# Patient Record
Sex: FEMALE | Race: WHITE
[De-identification: names, ages, dates, MRNs, and addresses within clinical notes are randomized per-mention and may not be internally consistent; named-entity substitution may affect disease eponyms.]

---

## 2022-10-31 ENCOUNTER — HOSPITAL ENCOUNTER (OUTPATIENT)
Dept: HOSPITAL 76 - DI.N | Age: 67
Discharge: HOME | End: 2022-10-31
Attending: STUDENT IN AN ORGANIZED HEALTH CARE EDUCATION/TRAINING PROGRAM
Payer: COMMERCIAL

## 2022-10-31 DIAGNOSIS — M20.11: Primary | ICD-10-CM

## 2022-10-31 DIAGNOSIS — M19.071: ICD-10-CM

## 2022-10-31 DIAGNOSIS — Z98.1: ICD-10-CM

## 2022-10-31 NOTE — XRAY REPORT
PROCEDURE:  Foot 3 View RT

 

INDICATIONS:  R FOOT PX

 

TECHNIQUE:  3 views of the foot were acquired.  

 

COMPARISON:  None

 

FINDINGS:  

 

Bones: Talus valgus alignment. Mild scattered arthrosis. Arthrodesis screw of the second toe DIP and 
PIP joints. Plate/fixation traversing the first MTP joint.

No acute fracture or dislocation is identified. Plantar enthesopathy.

 

Soft tissues: No suspicious soft tissue calcifications.

 

IMPRESSION:

Postsurgical changes of the first and second rays. No acute fracture or dislocation. Hallux valgus al
ignment and mild scattered arthrosis.

 

If there is high concern for further derangement, consider MRI evaluation. 

 

Reviewed by: Eduardo Al MD on 10/31/2022 9:51 AM PDT

Approved by: Eduardo Al MD on 10/31/2022 9:51 AM PDT

 

 

Station ID:  [id number]